# Patient Record
Sex: FEMALE | Race: WHITE | NOT HISPANIC OR LATINO | Employment: PART TIME | URBAN - NONMETROPOLITAN AREA
[De-identification: names, ages, dates, MRNs, and addresses within clinical notes are randomized per-mention and may not be internally consistent; named-entity substitution may affect disease eponyms.]

---

## 2022-05-29 ENCOUNTER — HOSPITAL ENCOUNTER (EMERGENCY)
Facility: HOSPITAL | Age: 27
Discharge: HOME | End: 2022-05-29
Attending: EMERGENCY MEDICINE

## 2022-05-29 VITALS
BODY MASS INDEX: 45.24 KG/M2 | OXYGEN SATURATION: 96 % | DIASTOLIC BLOOD PRESSURE: 100 MMHG | HEART RATE: 123 BPM | RESPIRATION RATE: 16 BRPM | SYSTOLIC BLOOD PRESSURE: 137 MMHG | TEMPERATURE: 97 F | HEIGHT: 62 IN | WEIGHT: 245.81 LBS

## 2022-05-29 DIAGNOSIS — J03.90 ACUTE TONSILLITIS, UNSPECIFIED ETIOLOGY: Primary | ICD-10-CM

## 2022-05-29 PROCEDURE — 99282 EMERGENCY DEPT VISIT SF MDM: CPT

## 2022-05-29 PROCEDURE — 99284 EMERGENCY DEPT VISIT MOD MDM: CPT | Performed by: PHYSICIAN ASSISTANT

## 2022-05-29 RX ORDER — AMOXICILLIN AND CLAVULANATE POTASSIUM 875; 125 MG/1; MG/1
1 TABLET, FILM COATED ORAL ONCE
Status: COMPLETED | OUTPATIENT
Start: 2022-05-29 | End: 2022-05-29

## 2022-05-29 RX ORDER — PREDNISONE 20 MG/1
40 TABLET ORAL ONCE
Status: COMPLETED | OUTPATIENT
Start: 2022-05-29 | End: 2022-05-29

## 2022-05-29 RX ORDER — AMOXICILLIN AND CLAVULANATE POTASSIUM 875; 125 MG/1; MG/1
1 TABLET, FILM COATED ORAL EVERY 12 HOURS SCHEDULED
Qty: 14 TABLET | Refills: 0 | Status: SHIPPED | OUTPATIENT
Start: 2022-05-29 | End: 2022-06-05

## 2022-05-29 RX ORDER — PREDNISONE 20 MG/1
20 TABLET ORAL DAILY
Qty: 5 TABLET | Refills: 0 | Status: SHIPPED | OUTPATIENT
Start: 2022-05-29 | End: 2022-06-03

## 2022-05-29 RX ADMIN — AMOXICILLIN AND CLAVULANATE POTASSIUM 1 TABLET: 875; 125 TABLET, FILM COATED ORAL at 22:43

## 2022-05-29 RX ADMIN — PREDNISONE 40 MG: 20 TABLET ORAL at 22:43

## 2022-05-30 NOTE — ED PROVIDER NOTES
History  Chief Complaint   Patient presents with    Sore Throat     Started Thursday  Pt reports known tonsil stones     The patient is a 47-year-old female who presents emergency department today with chief complaint of sore throat  The patient states that is been gradually worsening over last few days  She has noticed white spots on her tonsils and increasing pain  She states she has tried Tylenol Motrin over-the-counter  Denies any fevers chills, cough congestion, rhinorrhea, back pain nausea vomiting diarrhea  Patient has a recurrent history of strep throat and tonsil stones  She has not followed up was visualized due to lack of insurance  Sore Throat  Location:  Generalized  Quality:  Sore  Severity:  Moderate  Onset quality:  Gradual  Timing:  Constant  Progression:  Worsening  Chronicity:  New  Relieved by:  Nothing  Worsened by:  Nothing  Ineffective treatments:  None tried  Associated symptoms: no abdominal pain, no adenopathy, no chest pain, no chills, no cough, no ear discharge, no ear pain, no epistaxis, no eye discharge, no fever, no headaches, no neck stiffness, no night sweats, no plugged ear sensation, no postnasal drip, no rash, no rhinorrhea, no shortness of breath, no stridor, no trouble swallowing and no voice change        None       History reviewed  No pertinent past medical history  History reviewed  No pertinent surgical history  History reviewed  No pertinent family history  I have reviewed and agree with the history as documented  E-Cigarette/Vaping     E-Cigarette/Vaping Substances     Social History     Tobacco Use    Smoking status: Current Every Day Smoker     Packs/day: 1 00     Types: Cigarettes   Substance Use Topics    Alcohol use: Not Currently    Drug use: Never       Review of Systems   Constitutional: Negative for chills, fever and night sweats  HENT: Positive for sore throat   Negative for ear discharge, ear pain, nosebleeds, postnasal drip, rhinorrhea, trouble swallowing and voice change  Eyes: Negative for discharge  Respiratory: Negative for cough, shortness of breath and stridor  Cardiovascular: Negative for chest pain  Gastrointestinal: Negative for abdominal pain  Musculoskeletal: Negative for neck stiffness  Skin: Negative for rash  Neurological: Negative for headaches  Hematological: Negative for adenopathy  All other systems reviewed and are negative  Physical Exam  Physical Exam  Vitals and nursing note reviewed  Constitutional:       General: She is not in acute distress  Appearance: She is well-developed  HENT:      Head: Normocephalic and atraumatic  Right Ear: Tympanic membrane and external ear normal       Left Ear: Tympanic membrane and external ear normal       Nose: No congestion  Mouth/Throat:      Mouth: Mucous membranes are moist       Pharynx: Uvula midline  Posterior oropharyngeal erythema present  No pharyngeal swelling or uvula swelling  Tonsils: Tonsillar exudate present  No tonsillar abscesses  2+ on the right  2+ on the left  Eyes:      Conjunctiva/sclera: Conjunctivae normal       Pupils: Pupils are equal, round, and reactive to light  Cardiovascular:      Rate and Rhythm: Normal rate and regular rhythm  Heart sounds: No murmur heard  Pulmonary:      Effort: Pulmonary effort is normal  No respiratory distress  Breath sounds: Normal breath sounds  No wheezing  Abdominal:      General: Bowel sounds are normal       Palpations: Abdomen is soft  There is no mass  Tenderness: There is no abdominal tenderness  There is no rebound  Hernia: No hernia is present  Musculoskeletal:      Cervical back: Normal range of motion and neck supple  Skin:     General: Skin is warm and dry  Capillary Refill: Capillary refill takes less than 2 seconds  Neurological:      Mental Status: She is alert and oriented to person, place, and time        Coordination: Coordination normal    Psychiatric:         Behavior: Behavior normal          Vital Signs  ED Triage Vitals [05/29/22 2227]   Temperature Pulse Respirations Blood Pressure SpO2   (!) 97 °F (36 1 °C) (!) 123 16 137/100 96 %      Temp Source Heart Rate Source Patient Position - Orthostatic VS BP Location FiO2 (%)   Temporal Monitor Sitting Left arm --      Pain Score       5           Vitals:    05/29/22 2227 05/29/22 2230   BP: 137/100 137/100   Pulse: (!) 123    Patient Position - Orthostatic VS: Sitting          Visual Acuity      ED Medications  Medications   amoxicillin-clavulanate (AUGMENTIN) 875-125 mg per tablet 1 tablet (1 tablet Oral Given 5/29/22 2243)   predniSONE tablet 40 mg (40 mg Oral Given 5/29/22 2243)       Diagnostic Studies  Results Reviewed     None                 No orders to display              Procedures  Procedures         ED Course                               SBIRT 22yo+    Flowsheet Row Most Recent Value   SBIRT (23 yo +)    In order to provide better care to our patients, we are screening all of our patients for alcohol and drug use  Would it be okay to ask you these screening questions?  Unable to answer at this time Filed at: 05/29/2022 2247                    MDM  Number of Diagnoses or Management Options     Amount and/or Complexity of Data Reviewed  Decide to obtain previous medical records or to obtain history from someone other than the patient: yes  Review and summarize past medical records: yes  Independent visualization of images, tracings, or specimens: yes    Risk of Complications, Morbidity, and/or Mortality  Presenting problems: low  Diagnostic procedures: low  Management options: low    Patient Progress  Patient progress: stable      Disposition  Final diagnoses:   Acute tonsillitis, unspecified etiology     Time reflects when diagnosis was documented in both MDM as applicable and the Disposition within this note     Time User Action Codes Description Comment 5/29/2022 10:37 PM Soheila Vera Add [J03 90] Acute tonsillitis, unspecified etiology       ED Disposition     ED Disposition   Discharge    Condition   Stable    Date/Time   Sun May 29, 2022 10:37 PM    Comment   Lanny Rogers discharge to home/self care  Follow-up Information    None         Discharge Medication List as of 5/29/2022 10:42 PM      START taking these medications    Details   amoxicillin-clavulanate (AUGMENTIN) 875-125 mg per tablet Take 1 tablet by mouth every 12 (twelve) hours for 7 days, Starting Sun 5/29/2022, Until Sun 6/5/2022, Normal      predniSONE 20 mg tablet Take 1 tablet (20 mg total) by mouth daily for 5 days, Starting Sun 5/29/2022, Until Fri 6/3/2022, Normal             No discharge procedures on file      PDMP Review     None          ED Provider  Electronically Signed by           Wero Vilchis PA-C  05/29/22 9931

## 2022-07-17 ENCOUNTER — HOSPITAL ENCOUNTER (EMERGENCY)
Facility: HOSPITAL | Age: 27
Discharge: HOME | End: 2022-07-17
Attending: EMERGENCY MEDICINE | Admitting: EMERGENCY MEDICINE

## 2022-07-17 VITALS
HEART RATE: 78 BPM | WEIGHT: 245 LBS | DIASTOLIC BLOOD PRESSURE: 88 MMHG | OXYGEN SATURATION: 98 % | HEIGHT: 62 IN | BODY MASS INDEX: 45.08 KG/M2 | SYSTOLIC BLOOD PRESSURE: 135 MMHG | RESPIRATION RATE: 16 BRPM | TEMPERATURE: 97 F

## 2022-07-17 DIAGNOSIS — J06.9 VIRAL URI WITH COUGH: Primary | ICD-10-CM

## 2022-07-17 LAB
FLUAV RNA RESP QL NAA+PROBE: NEGATIVE
FLUBV RNA RESP QL NAA+PROBE: NEGATIVE
RSV RNA RESP QL NAA+PROBE: NEGATIVE
SARS-COV-2 RNA RESP QL NAA+PROBE: POSITIVE

## 2022-07-17 PROCEDURE — 99284 EMERGENCY DEPT VISIT MOD MDM: CPT | Performed by: EMERGENCY MEDICINE

## 2022-07-17 PROCEDURE — 99283 EMERGENCY DEPT VISIT LOW MDM: CPT

## 2022-07-17 PROCEDURE — 0241U HB NFCT DS VIR RESP RNA 4 TRGT: CPT | Performed by: EMERGENCY MEDICINE

## 2022-07-17 RX ORDER — ONDANSETRON 4 MG/1
4 TABLET, ORALLY DISINTEGRATING ORAL EVERY 6 HOURS PRN
Qty: 20 TABLET | Refills: 0 | Status: SHIPPED | OUTPATIENT
Start: 2022-07-17

## 2022-07-17 RX ORDER — ONDANSETRON 4 MG/1
4 TABLET, ORALLY DISINTEGRATING ORAL ONCE
Status: COMPLETED | OUTPATIENT
Start: 2022-07-17 | End: 2022-07-17

## 2022-07-17 RX ADMIN — ONDANSETRON 4 MG: 4 TABLET, ORALLY DISINTEGRATING ORAL at 18:35

## 2022-07-17 NOTE — Clinical Note
Francisca Bauman was seen and treated in our emergency department on 7/17/2022  Diagnosis:     Gordy Brown  may return to work on return date  She may return on this date: 07/22/2022    Please excuse Gordy Brown from 7/12, 7/13, and 7/15  If you have any questions or concerns, please don't hesitate to call        Henretta Riedel, MD    ______________________________           _______________          _______________  Hospital Representative                              Date                                Time

## 2022-07-17 NOTE — DISCHARGE INSTRUCTIONS
Return to the ED for worsening symptoms such as shortness of breath, chest pain, persistent vomiting, dehydration or any other concerns  You have tested for COVID  Please quarantine yourself for the next 5 days

## 2022-07-17 NOTE — Clinical Note
Carson Lehman was seen and treated in our emergency department on 7/17/2022  Diagnosis:     [facility]  may return to work on return date  She may return on this date: 07/20/2022    Please excuse [facility] from 7/12, 7/13, and 7/15  If you have any questions or concerns, please don't hesitate to call        Chey Meek MD    ______________________________           _______________          _______________  Hospital Representative                              Date                                Time

## 2022-07-17 NOTE — Clinical Note
Javier Mauricio was seen and treated in our emergency department on 7/17/2022  Diagnosis:     [place]  may return to work on return date  She may return on this date: 07/20/2022    Please excuse [place] from 7/12, 7/13, and 7/15  If you have any questions or concerns, please don't hesitate to call        Darren Shelby MD    ______________________________           _______________          _______________  Hospital Representative                              Date                                Time

## 2022-07-17 NOTE — Clinical Note
Sulema Brunner was seen and treated in our emergency department on 7/17/2022  Diagnosis:     Christiano Womack  may return to work on return date  She may return on this date: 07/20/2022    Please excuse Christiano Womack from 7/12, 7/13, and 7/15  If you have any questions or concerns, please don't hesitate to call        [place] Chirinos MD    ______________________________           _______________          _______________  Hospital Representative                              Date                                Time

## 2022-07-17 NOTE — Clinical Note
Pearley Castleman was seen and treated in our emergency department on 7/17/2022  Diagnosis:     Demetra Devlin  may return to work on return date  She may return on this date: 07/22/2022    Please excuse Demetra Devlin from 7/12, 7/13, and 7/15  If you have any questions or concerns, please don't hesitate to call        Clay Boss MD    ______________________________           _______________          _______________  Hospital Representative                              Date                                Time

## 2022-07-17 NOTE — ED PROVIDER NOTES
History  Chief Complaint   Patient presents with    Cough     Feeling sick since Tuesday with pain disc and diarrhea      HPI   27F w hx of endometriosis, PCOS, seizure disorder presenting w cough and weakness  For the past 5 days, patient has been having headaches, myalgias, diarrhea, sneezing, cough productive of green sputum, loss of appetite, and chills  Last wk, she was exposed a friend who had similar symptoms  Her friend tested for COVID and was negative  Took Tylenol w/ minimal relief  She is not vaccinated for COVID  PMHx: endometriosis, PCOS, seizure disorder    PSHx: laprascopy for endometriosis    Non-contributory family hx    I have reviewed and agree with the history as documented  E-Cigarette/Vaping    E-Cigarette Use Never User      E-Cigarette/Vaping Substances     Social History     Tobacco Use    Smoking status: Current Every Day Smoker     Packs/day: 1 00     Types: Cigarettes    Smokeless tobacco: Never Used   Vaping Use    Vaping Use: Never used   Substance Use Topics    Alcohol use: Not Currently    Drug use: Never   Currently smokes    Review of Systems   Constitutional: Negative for chills and fever  HENT: Negative for ear pain and sore throat  Eyes: Negative for pain and visual disturbance  Respiratory: Positive for cough  Negative for shortness of breath  Cardiovascular: Negative for chest pain and palpitations  Gastrointestinal: Positive for diarrhea and nausea  Negative for abdominal pain and vomiting  Genitourinary: Negative for dysuria and hematuria  Musculoskeletal: Positive for myalgias  Negative for arthralgias and back pain  Skin: Negative for color change and rash  Neurological: Positive for headaches  Negative for seizures and syncope  All other systems reviewed and are negative  Physical Exam  Physical Exam  Vitals and nursing note reviewed  Constitutional:       General: She is not in acute distress       Appearance: She is well-developed  HENT:      Head: Normocephalic and atraumatic  Eyes:      Conjunctiva/sclera: Conjunctivae normal    Cardiovascular:      Rate and Rhythm: Normal rate and regular rhythm  Heart sounds: No murmur heard  Pulmonary:      Effort: Pulmonary effort is normal  No respiratory distress  Breath sounds: Normal breath sounds  Abdominal:      Palpations: Abdomen is soft  Tenderness: There is no abdominal tenderness  Musculoskeletal:      Cervical back: Neck supple  Skin:     General: Skin is warm and dry  Neurological:      Mental Status: She is alert  Vital Signs  ED Triage Vitals [07/17/22 1807]   Temperature Pulse Respirations Blood Pressure SpO2   (!) 97 °F (36 1 °C) 78 16 135/88 98 %      Temp src Heart Rate Source Patient Position - Orthostatic VS BP Location FiO2 (%)   -- -- -- Left arm --      Pain Score       No Pain           Vitals:    07/17/22 1807   BP: 135/88   Pulse: 78         Visual Acuity      ED Medications  Medications   ondansetron (ZOFRAN-ODT) dispersible tablet 4 mg (4 mg Oral Given 7/17/22 1835)       Diagnostic Studies  Results Reviewed     Procedure Component Value Units Date/Time    FLU/RSV/COVID - if FLU/RSV clinically relevant [[id number]]  (Abnormal) Collected: 07/17/22 1825    Lab Status: Final result Specimen: Nares from Nose Updated: 07/17/22 1902     SARS-CoV-2 Positive     INFLUENZA A PCR Negative     INFLUENZA B PCR Negative     RSV PCR Negative    Narrative:      FOR PEDIATRIC PATIENTS - copy/paste COVID Guidelines URL to browser: [email or web address]    SARS-CoV-2 assay is a Nucleic Acid Amplification assay intended for the  qualitative detection of nucleic acid from SARS-CoV-2 in nasopharyngeal  swabs  Results are for the presumptive identification of SARS-CoV-2 RNA      Positive results are indicative of infection with SARS-CoV-2, the virus  causing COVID-19, but do not rule out bacterial infection or co-infection  with other viruses  Laboratories within the [place] and its  territories are required to report all positive results to the appropriate  public health authorities  Negative results do not preclude SARS-CoV-2  infection and should not be used as the sole basis for treatment or other  patient management decisions  Negative results must be combined with  clinical observations, patient history, and epidemiological information  This test has not been FDA cleared or approved  This test has been authorized by FDA under an Emergency Use Authorization  (EUA)  This test is only authorized for the duration of time the  declaration that circumstances exist justifying the authorization of the  emergency use of an in vitro diagnostic tests for detection of SARS-CoV-2  virus and/or diagnosis of COVID-19 infection under section 564(b)(1) of  the Act, 21 U  S C  384ZAL-2(B)(2), unless the authorization is terminated  or revoked sooner  The test has been validated but independent review by FDA  and CLIA is pending  Test performed using [employer] Gene[employer]pert: This RT-PCR assay targets N2,  a region unique to SARS-CoV-2  A conserved region in the E-gene was chosen  for pan-Sarbecovirus detection which includes SARS-CoV-2  No orders to display          Procedures  Procedures  None      ED Course       MDM  27F presenting w multiple complaints including headaches, myalgias, diarrhea, sneezing, cough  Vitals stable - hemodynamically stable, not tachycardic, SpO2 98%  Clinical presentation suggestive of viral URI, suspect COVID vs flu  COVID/Flu/RSV swab was obtained and will call patient back about results  Zofran prescribed for nausea  Discussed symptomatic management       Disposition  Final diagnoses:   Viral URI with cough     Time reflects when diagnosis was documented in both MDM as applicable and the Disposition within this note     Time User Action Codes Description Comment 7/17/2022  6:28 PM Gustavo Sargent Add [J06 9] Viral URI with cough       ED Disposition     ED Disposition   Discharge    Condition   Stable    Date/Time   Sun Jul 17, 2022  6:28 PM    Comment   [facility] Record discharge to home/self care  Follow-up Information    None         Discharge Medication List as of 7/17/2022  7:22 PM      START taking these medications    Details   ondansetron (Zofran ODT) 4 mg disintegrating tablet Take 1 tablet (4 mg total) by mouth every 6 (six) hours as needed for nausea or vomiting, Starting Sun 7/17/2022, Normal             No discharge procedures on file  PDMP Review     None          ED Provider  Electronically Signed by           Wilner Barron MD  07/17/22 6279    Addendum: 2043 Patient tested positive for COVID  I called patient and informed her of the results  Discussed quarantine precautions          Wilner Barron MD  07/17/22 6743

## 2022-07-17 NOTE — Clinical Note
Amy Cabot was seen and treated in our emergency department on 7/17/2022  Diagnosis:     Jerome Dubois  may return to work on return date  She may return on this date: 07/22/2022    Please excuse Jerome Dubois from 7/12, 7/13, and 7/15  If you have any questions or concerns, please don't hesitate to call        Claude Marine, MD    ______________________________           _______________          _______________  Hospital Representative                              Date                                Time

## 2022-09-11 ENCOUNTER — HOSPITAL ENCOUNTER (EMERGENCY)
Facility: HOSPITAL | Age: 27
Discharge: HOME | End: 2022-09-11
Attending: EMERGENCY MEDICINE | Admitting: EMERGENCY MEDICINE

## 2022-09-11 VITALS
DIASTOLIC BLOOD PRESSURE: 74 MMHG | HEART RATE: 85 BPM | SYSTOLIC BLOOD PRESSURE: 152 MMHG | TEMPERATURE: 97.2 F | OXYGEN SATURATION: 96 % | WEIGHT: 246.47 LBS | RESPIRATION RATE: 18 BRPM | BODY MASS INDEX: 45.08 KG/M2

## 2022-09-11 DIAGNOSIS — M54.50 LOW BACK PAIN: Primary | ICD-10-CM

## 2022-09-11 LAB
FLUAV RNA RESP QL NAA+PROBE: NEGATIVE
FLUBV RNA RESP QL NAA+PROBE: NEGATIVE
RSV RNA RESP QL NAA+PROBE: NEGATIVE
SARS-COV-2 RNA RESP QL NAA+PROBE: NEGATIVE

## 2022-09-11 PROCEDURE — 0241U HB NFCT DS VIR RESP RNA 4 TRGT: CPT

## 2022-09-11 PROCEDURE — 99283 EMERGENCY DEPT VISIT LOW MDM: CPT

## 2022-09-11 PROCEDURE — 99283 EMERGENCY DEPT VISIT LOW MDM: CPT | Performed by: EMERGENCY MEDICINE

## 2022-09-11 PROCEDURE — 96372 THER/PROPH/DIAG INJ SC/IM: CPT

## 2022-09-11 PROCEDURE — 20552 NJX 1/MLT TRIGGER POINT 1/2: CPT | Performed by: EMERGENCY MEDICINE

## 2022-09-11 RX ORDER — KETOROLAC TROMETHAMINE 30 MG/ML
30 INJECTION, SOLUTION INTRAMUSCULAR; INTRAVENOUS ONCE
Status: COMPLETED | OUTPATIENT
Start: 2022-09-11 | End: 2022-09-11

## 2022-09-11 RX ORDER — ACETAMINOPHEN 325 MG/1
650 TABLET ORAL ONCE
Status: COMPLETED | OUTPATIENT
Start: 2022-09-11 | End: 2022-09-11

## 2022-09-11 RX ORDER — BUPIVACAINE HYDROCHLORIDE 2.5 MG/ML
10 INJECTION, SOLUTION EPIDURAL; INFILTRATION; INTRACAUDAL ONCE
Status: COMPLETED | OUTPATIENT
Start: 2022-09-11 | End: 2022-09-11

## 2022-09-11 RX ORDER — METHYLPREDNISOLONE 4 MG/1
TABLET ORAL
Qty: 21 TABLET | Refills: 0 | Status: SHIPPED | OUTPATIENT
Start: 2022-09-11

## 2022-09-11 RX ORDER — TRIAMCINOLONE ACETONIDE 40 MG/ML
40 INJECTION, SUSPENSION INTRA-ARTICULAR; INTRAMUSCULAR ONCE
Status: COMPLETED | OUTPATIENT
Start: 2022-09-11 | End: 2022-09-11

## 2022-09-11 RX ORDER — CYCLOBENZAPRINE HCL 10 MG
10 TABLET ORAL
Qty: 5 TABLET | Refills: 0 | Status: SHIPPED | OUTPATIENT
Start: 2022-09-11 | End: 2022-09-16

## 2022-09-11 RX ADMIN — TRIAMCINOLONE ACETONIDE 40 MG: 40 INJECTION, SUSPENSION INTRA-ARTICULAR; INTRAMUSCULAR at 11:56

## 2022-09-11 RX ADMIN — TRIAMCINOLONE ACETONIDE 40 MG: 40 INJECTION, SUSPENSION INTRA-ARTICULAR; INTRAMUSCULAR at 12:48

## 2022-09-11 RX ADMIN — BUPIVACAINE HYDROCHLORIDE 10 ML: 2.5 INJECTION, SOLUTION EPIDURAL; INFILTRATION; INTRACAUDAL; PERINEURAL at 11:56

## 2022-09-11 RX ADMIN — ACETAMINOPHEN 650 MG: 325 TABLET ORAL at 11:55

## 2022-09-11 RX ADMIN — KETOROLAC TROMETHAMINE 30 MG: 30 INJECTION, SOLUTION INTRAMUSCULAR at 11:56

## 2022-09-11 NOTE — Clinical Note
Adrien Chun was seen and treated in our emergency department on 9/11/2022  Diagnosis:     Kacy Elise  is off the rest of the shift today  She may return on this date:     Please excuse patient from work from 9/10/22 (yesterday) as well  If you have any questions or concerns, please don't hesitate to call        Cadence Cates MD    ______________________________           _______________          _______________  Hospital Representative                              Date                                Time

## 2022-09-11 NOTE — DISCHARGE INSTRUCTIONS
You were seen today for back pain  We gave you medications for your back pain  If your back pain persists, then please see your PCP  If you develop inability to feel your legs, walk, pooping or peeing yourself, then you NEED to see a healthcare provider or go to an ED  How is back pain treated? Most people with an episode of low back pain do not have a serious medical problem, and can try simple treatments such as:  -Staying active - The best thing you can do is to stay as active as possible  People with low back pain recover faster if they stay active  If your pain is severe, you might need to rest for a day or 2  But it's important to get back to walking and moving as soon as possible  While you should avoid heavy lifting and sports while your back hurts, try to keep doing your normal daily activities   -Heat - Some people find that it helps to use a heating pad or heated wrap  Be careful to avoid high heat settings to prevent skin burns  -Medicines - First, you can try pain medicines that you can get without a prescription  In many cases, doctors suggest first trying a nonsteroidal antiinflammatory drug, or "NSAID " NSAIDs include ibuprofen (sample brand names: Advil, Motrin) and naproxen (sample brand name: Aleve)  These might work better than acetaminophen (Tylenol) for back pain  While back pain usually goes away within a few weeks, some people do continue to have pain for longer  In this case, additional treatments might include:  -Self care - This involves being aware of your pain  While you should rest when you need to, it's important to stay active as much as you can  Things like applying heat and doing gentle stretches can help you feel better, too   -Physical therapy - A physical therapist is an exercise expert who can teach you stretches and movements to help strengthen your muscles  The goal is to relieve pain but also help you get back to your normal activities      Exercises you can try include walking, swimming, or using an exercise bike  Some people also find that [address] or yoga can help with their back pain  Finding activities you enjoy can help you stay active  -Reducing stress - Some people find that it helps to try something called "mindfulness-based stress reduction " This involves going to a group program to practice relaxation and meditation  If your back pain is making you feel anxious or depressed, talk to your doctor or nurse  There are other treatments that can help with these problems  Only a small number of people end up needing surgery to treat back pain  What can I do to keep from getting back pain again? The best thing you can do is to stay active  Doing exercises to strengthen and stretch your back can help  You can also:  -Learn to lift using your legs instead of your back  -Avoid sitting or standing in the same position for too long    Having back pain can be frustrating and scary  But it can help to know that doing these things can lower your risk of having another episode

## 2022-09-11 NOTE — ED PROVIDER NOTES
History  Chief Complaint   Patient presents with    Back Pain     Pt states lower left back pain for months that is worse now that she took a new job  Describes pain as burning  Denies changes to bowel or bladder     HPI   27F presenting with lower back pain  For the past few months, has been having left lower back pain  Pain described as burning sensation  Has been worsening over the past 2 wks and yesterday was at its worst  Yesterday, patient lifted a box up  Patient is a   Has tried Tylenol and Advil with minimal relief  No previous back injuries or surgeries  Denies IVDU  Denies fevers, bowel/bladder incontinence, saddle anesthesia, leg numbness/weakness  Prior to Admission Medications   Prescriptions Last Dose Informant Patient Reported? Taking?   ondansetron (Zofran ODT) 4 mg disintegrating tablet   No No   Sig: Take 1 tablet (4 mg total) by mouth every 6 (six) hours as needed for nausea or vomiting      Facility-Administered Medications: None       History reviewed  No pertinent past medical history  Past Surgical History:   Procedure Laterality Date    ENDOMETRIAL BIOPSY         History reviewed  No pertinent family history  I have reviewed and agree with the history as documented  E-Cigarette/Vaping    E-Cigarette Use Current Every Day User      E-Cigarette/Vaping Substances    Flavoring Yes      Social History     Tobacco Use    Smoking status: Current Every Day Smoker     Packs/day: 1 00     Types: Cigarettes    Smokeless tobacco: Never Used   Vaping Use    Vaping Use: Every day    Substances: Flavoring   Substance Use Topics    Alcohol use: Not Currently    Drug use: Never       Review of Systems   Constitutional: Negative for chills and fever  HENT: Negative for ear pain and sore throat  Eyes: Negative for pain and visual disturbance  Respiratory: Negative for cough and shortness of breath  Cardiovascular: Negative for chest pain and palpitations  Gastrointestinal: Negative for abdominal pain and vomiting  Genitourinary: Negative for dysuria and hematuria  Musculoskeletal: Positive for back pain  Negative for arthralgias  Skin: Negative for color change and rash  Neurological: Negative for seizures and syncope  All other systems reviewed and are negative  Physical Exam  Physical Exam  Vitals and nursing note reviewed  Constitutional:       General: She is not in acute distress  Appearance: She is well-developed  HENT:      Head: Normocephalic and atraumatic  Cardiovascular:      Rate and Rhythm: Normal rate and regular rhythm  Musculoskeletal:         General: Tenderness present  Cervical back: Neck supple  Comments: Point tenderness over left lumbar region   Skin:     General: Skin is warm and dry  Neurological:      Mental Status: She is alert  Cranial Nerves: Cranial nerves are intact  Sensory: Sensation is intact  Motor: Motor function is intact  Coordination: Coordination is intact           Vital Signs  ED Triage Vitals   Temperature Pulse Respirations Blood Pressure SpO2   09/11/22 1134 09/11/22 1134 09/11/22 1134 09/11/22 1134 09/11/22 1134   (!) 97 2 °F (36 2 °C) (!) 115 20 152/74 99 %      Temp Source Heart Rate Source Patient Position - Orthostatic VS BP Location FiO2 (%)   09/11/22 1134 09/11/22 1245 09/11/22 1134 09/11/22 1134 --   Temporal Monitor Lying Left arm       Pain Score       09/11/22 1134       9           Vitals:    09/11/22 1134 09/11/22 1245   BP: 152/74    Pulse: (!) 115 85   Patient Position - Orthostatic VS: Lying          Visual Acuity      ED Medications  Medications   ketorolac (TORADOL) injection 30 mg (30 mg Intramuscular Given 9/11/22 1156)   acetaminophen (TYLENOL) tablet 650 mg (650 mg Oral Given 9/11/22 1155)   bupivacaine (PF) (MARCAINE) 0 25 % injection 10 mL (10 mL Infiltration Given 9/11/22 1156)   triamcinolone acetonide (KENALOG-40) 40 mg/mL injection 40 mg (40 mg Intramuscular Given 9/11/22 1156)   triamcinolone acetonide (KENALOG-40) 40 mg/mL injection 40 mg (40 mg Intramuscular Given 9/11/22 1248)       Diagnostic Studies  Results Reviewed     Procedure Component Value Units Date/Time    FLU/RSV/COVID - if FLU/RSV clinically relevant [[id number]]  (Normal) Collected: 09/11/22 1219    Lab Status: Final result Specimen: Nares from Nose Updated: 09/11/22 1310     SARS-CoV-2 Negative     INFLUENZA A PCR Negative     INFLUENZA B PCR Negative     RSV PCR Negative    Narrative:      FOR PEDIATRIC PATIENTS - copy/paste COVID Guidelines URL to browser: [email or web address]  [email or web address]    SARS-CoV-2 assay is a Nucleic Acid Amplification assay intended for the  qualitative detection of nucleic acid from SARS-CoV-2 in nasopharyngeal  swabs  Results are for the presumptive identification of SARS-CoV-2 RNA  Positive results are indicative of infection with SARS-CoV-2, the virus  causing COVID-19, but do not rule out bacterial infection or co-infection  with other viruses  Laboratories within the [place] and its  territories are required to report all positive results to the appropriate  public health authorities  Negative results do not preclude SARS-CoV-2  infection and should not be used as the sole basis for treatment or other  patient management decisions  Negative results must be combined with  clinical observations, patient history, and epidemiological information  This test has not been FDA cleared or approved  This test has been authorized by FDA under an Emergency Use Authorization  (EUA)  This test is only authorized for the duration of time the  declaration that circumstances exist justifying the authorization of the  emergency use of an in vitro diagnostic tests for detection of SARS-CoV-2  virus and/or diagnosis of COVID-19 infection under section 564(b)(1) of  the Act, 21 U  S C  035HIA-9(A)(0), unless the authorization is terminated  or revoked sooner  The test has been validated but independent review by FDA  and CLIA is pending  Test performed using [employer] Gene[employer]pert: This RT-PCR assay targets N2,  a region unique to SARS-CoV-2  A conserved region in the E-gene was chosen  for pan-Sarbecovirus detection which includes SARS-CoV-2  No orders to display              Procedures  Trigger Injection 1 or 2 muscles    Date/Time: 9/11/2022 12:53 PM  Performed by: Daniele Davila MD  Authorized by: Daniele Davila MD     Patient location:  ED  Consent:     Consent obtained:  Verbal    Consent given by:  Patient    Risks discussed: Allergic reaction, bleeding, intravenous injection, nerve damage, pain, infection, swelling and unsuccessful block    Alternatives discussed:  Alternative treatment  Universal protocol:     Patient identity confirmed:  Verbally with patient  Location:     Body area:  Lower back    Injection Trigger Points:  1  Pre-procedure details:     Skin preparation:  Chloraprep  Skin anesthesia:     Skin anesthesia method:  Local infiltration    Local anesthetic:  Bupivacaine 0 25% w/o epi  Procedure details:     Needle gauge:  25 G    Anesthetic injected:  Bupivacaine 0 25% w/o epi    Steroid injected:  Triamcinolone    Additive injected:  None    Injection procedure:  Incremental injection, negative aspiration for blood, introduced needle, anatomic landmarks palpated and anatomic landmarks identified  Post-procedure details:     Dressing:  Sterile dressing    Outcome:  Pain improved    Patient tolerance of procedure: Tolerated well, no immediate complications        ED Course  ED Course as of 09/11/22 1331   Sun Sep 11, 2022   1318 Reevaluated  Some improvement in back pain  MDM  27F presenting w lower back pain  Likely muscle strain  No red flag symptoms for back pain  No neurologic deficits  Given pain medications and trigger point injection with improvement of pain   Prescribed flexeril and medrol dosepak for outpatient use  COVID/Flu/RSV negative, and patient informed  Discharged home in stable condition  Disposition  Final diagnoses:   Low back pain     Time reflects when diagnosis was documented in both MDM as applicable and the Disposition within this note     Time User Action Codes Description Comment    9/11/2022 12:59 PM Bandar Chowdhury John [M54 50] Low back pain       ED Disposition     ED Disposition   Discharge    Condition   Stable    Date/Time   Sun Sep 11, 2022 12:59 PM    Comment   Kiana Garg discharge to home/self care  Follow-up Information    None         Patient's Medications   Discharge Prescriptions    CYCLOBENZAPRINE (FLEXERIL) 10 MG TABLET    Take 1 tablet (10 mg total) by mouth daily at bedtime for 5 days       Start Date: 9/11/2022 End Date: 9/16/2022       Order Dose: 10 mg       Quantity: 5 tablet    Refills: 0    METHYLPREDNISOLONE 4 MG TABLET THERAPY PACK    Use as directed on package       Start Date: 9/11/2022 End Date: --       Order Dose: --       Quantity: 21 tablet    Refills: 0       No discharge procedures on file      PDMP Review     None          ED Provider  Electronically Signed by           Jose Rapp MD  09/11/22 3651

## 2023-11-04 NOTE — Clinical Note
Maury Duong was seen and treated in our emergency department on 7/17/2022  Diagnosis:     [place]  may return to work on return date  She may return on this date: 07/22/2022    Please excuse [place] from 7/12, 7/13, and 7/15  If you have any questions or concerns, please don't hesitate to call        Tami Nunes MD    ______________________________           _______________          _______________  Hospital Representative                              Date                                Time Female

## 2024-07-26 ENCOUNTER — DOCTOR'S OFFICE (OUTPATIENT)
Dept: URBAN - NONMETROPOLITAN AREA CLINIC 1 | Facility: CLINIC | Age: 29
Setting detail: OPHTHALMOLOGY
End: 2024-07-26
Payer: COMMERCIAL

## 2024-07-26 DIAGNOSIS — S05.01XA: ICD-10-CM

## 2024-07-26 PROBLEM — H52.03 HYPEROPIA; BOTH EYES: Status: ACTIVE | Noted: 2024-07-26

## 2024-07-26 PROCEDURE — 99213 OFFICE O/P EST LOW 20 MIN: CPT | Performed by: OPHTHALMOLOGY

## 2024-07-26 ASSESSMENT — CONFRONTATIONAL VISUAL FIELD TEST (CVF)
OD_FINDINGS: FULL
OS_FINDINGS: FULL

## 2025-01-22 ENCOUNTER — APPOINTMENT (EMERGENCY)
Dept: NON INVASIVE DIAGNOSTICS | Facility: HOSPITAL | Age: 30
End: 2025-01-22

## 2025-01-22 ENCOUNTER — HOSPITAL ENCOUNTER (EMERGENCY)
Facility: HOSPITAL | Age: 30
Discharge: HOME | End: 2025-01-22
Attending: EMERGENCY MEDICINE

## 2025-01-22 ENCOUNTER — APPOINTMENT (EMERGENCY)
Dept: RADIOLOGY | Facility: HOSPITAL | Age: 30
End: 2025-01-22

## 2025-01-22 VITALS
DIASTOLIC BLOOD PRESSURE: 93 MMHG | RESPIRATION RATE: 16 BRPM | SYSTOLIC BLOOD PRESSURE: 142 MMHG | TEMPERATURE: 99.1 F | BODY MASS INDEX: 43.82 KG/M2 | HEART RATE: 86 BPM | WEIGHT: 239.6 LBS | OXYGEN SATURATION: 99 %

## 2025-01-22 DIAGNOSIS — M25.551 RIGHT HIP PAIN: Primary | ICD-10-CM

## 2025-01-22 DIAGNOSIS — S76.211A INGUINAL STRAIN, RIGHT, INITIAL ENCOUNTER: ICD-10-CM

## 2025-01-22 PROCEDURE — 99283 EMERGENCY DEPT VISIT LOW MDM: CPT

## 2025-01-22 PROCEDURE — 96372 THER/PROPH/DIAG INJ SC/IM: CPT

## 2025-01-22 PROCEDURE — 99284 EMERGENCY DEPT VISIT MOD MDM: CPT | Performed by: PHYSICIAN ASSISTANT

## 2025-01-22 PROCEDURE — 93971 EXTREMITY STUDY: CPT

## 2025-01-22 PROCEDURE — 93971 EXTREMITY STUDY: CPT | Performed by: SURGERY

## 2025-01-22 PROCEDURE — 73502 X-RAY EXAM HIP UNI 2-3 VIEWS: CPT

## 2025-01-22 RX ORDER — OXYCODONE AND ACETAMINOPHEN 5; 325 MG/1; MG/1
1 TABLET ORAL EVERY 6 HOURS PRN
Qty: 6 TABLET | Refills: 0 | Status: SHIPPED | OUTPATIENT
Start: 2025-01-22

## 2025-01-22 RX ORDER — PREDNISONE 20 MG/1
40 TABLET ORAL DAILY
Qty: 10 TABLET | Refills: 0 | Status: SHIPPED | OUTPATIENT
Start: 2025-01-22 | End: 2025-01-27

## 2025-01-22 RX ORDER — OXYCODONE AND ACETAMINOPHEN 5; 325 MG/1; MG/1
1 TABLET ORAL ONCE
Refills: 0 | Status: COMPLETED | OUTPATIENT
Start: 2025-01-22 | End: 2025-01-22

## 2025-01-22 RX ORDER — KETOROLAC TROMETHAMINE 30 MG/ML
30 INJECTION, SOLUTION INTRAMUSCULAR; INTRAVENOUS ONCE
Status: COMPLETED | OUTPATIENT
Start: 2025-01-22 | End: 2025-01-22

## 2025-01-22 RX ORDER — METHOCARBAMOL 500 MG/1
500 TABLET, FILM COATED ORAL 2 TIMES DAILY
Qty: 20 TABLET | Refills: 0 | Status: SHIPPED | OUTPATIENT
Start: 2025-01-22

## 2025-01-22 RX ADMIN — KETOROLAC TROMETHAMINE 30 MG: 30 INJECTION, SOLUTION INTRAMUSCULAR; INTRAVENOUS at 12:50

## 2025-01-22 RX ADMIN — OXYCODONE HYDROCHLORIDE AND ACETAMINOPHEN 1 TABLET: 5; 325 TABLET ORAL at 12:49

## 2025-01-22 NOTE — Clinical Note
Michaela Weiner was seen and treated in our emergency department on 1/22/2025.                Diagnosis:     Michaela  is off the rest of the shift today, may return to work on return date.    She may return on this date: 01/29/2025         If you have any questions or concerns, please don't hesitate to call.      Walter Dawson PA-C    ______________________________           _______________          _______________  Hospital Representative                              Date                                Time

## 2025-01-23 NOTE — ED PROVIDER NOTES
Time reflects when diagnosis was documented in both MDM as applicable and the Disposition within this note       Time User Action Codes Description Comment    1/22/2025  1:49 PM Walter Dawson [M25.551] Right hip pain     1/22/2025  1:49 PM Walter Dawson [S76.211A] Inguinal strain, right, initial encounter           ED Disposition       ED Disposition   Discharge    Condition   Stable    Date/Time   Wed Jan 22, 2025  1:49 PM    Comment   Michaela Weiner discharge to home/self care.                   Assessment & Plan       Medical Decision Making  Patient is a 29-year-old female presents today with a chief complaint of right hip pain.  The patient states that over the last few days she has been having right hip and leg pain.  She states that it gradually worsened but today noticed to have pain shooting down the anterior leg.  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  She denies any known falls or injuries that may have provoked this.  Taken over-the-counter pain medication without relief.  Denies any back pain nausea vomiting diarrhea dysuria hematuria.    Patient has pain with active range of motion of the right hip.  Also has pain with passive range of motion of the right hip with internal and external rotation.  Patient had negative DVT study.  Imaging for any acute bony abnormality.  Will continue to treat for groin strain/hip pain.  Concern for groin injury at this time due to repetitive movements.  Patient in agreement with treatment plan will continue treatment and follow-up with orthopedics as needed.    Amount and/or Complexity of Data Reviewed  Radiology: ordered and independent interpretation performed. Decision-making details documented in ED Course.    Risk  Prescription drug management.        ED Course as of 01/22/25 2027 Wed Jan 22, 2025   1341 No DVT         Medications    ketorolac (TORADOL) injection 30 mg (30 mg Intramuscular Given 1/22/25 1250)   oxyCODONE-acetaminophen (PERCOCET) 5-325 mg per tablet 1 tablet (1 tablet Oral Given 1/22/25 1249)       ED Risk Strat Scores                          SBIRT 22yo+      Flowsheet Row Most Recent Value   Initial Alcohol Screen: US AUDIT-C     1. How often do you have a drink containing alcohol? 0 Filed at: 01/22/2025 1201   2. How many drinks containing alcohol do you have on a typical day you are drinking?  0 Filed at: 01/22/2025 1201   3b. FEMALE Any Age, or MALE 65+: How often do you have 4 or more drinks on one occassion? 0 Filed at: 01/22/2025 1201   Audit-C Score 0 Filed at: 01/22/2025 1201   REINA: How many times in the past year have you...    Used an illegal drug or used a prescription medication for non-medical reasons? Never Filed at: 01/22/2025 1201                            History of Present Illness       Chief Complaint   Patient presents with    Hip Pain     Reports pain in her right hip for the past week, reports pain starts in her hip and radiates down to her ankle describes it as a burning pain. Took tylenol around 0730 without relief.       Past Medical History:   Diagnosis Date    Endometriosis       Past Surgical History:   Procedure Laterality Date    ENDOMETRIAL BIOPSY        History reviewed. No pertinent family history.   Social History     Tobacco Use    Smoking status: Every Day     Current packs/day: 1.00     Types: Cigarettes    Smokeless tobacco: Never   Vaping Use    Vaping status: Every Day    Substances: Flavoring   Substance Use Topics    Alcohol use: Not Currently    Drug use: Never      E-Cigarette/Vaping    E-Cigarette Use Current Every Day User       E-Cigarette/Vaping Substances    Flavoring Yes       I have reviewed and agree with the history as documented.     Patient is a 29-year-old female presents today with a chief complaint of right hip pain.  The patient states that over the last few days  she has been having right hip and leg pain.  She states that it gradually worsened but today noticed to have pain shooting down the anterior leg.  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  She denies any known falls or injuries that may have provoked this.  Taken over-the-counter pain medication without relief.  Denies any back pain nausea vomiting diarrhea dysuria hematuria.          Review of Systems   All other systems reviewed and are negative.          Objective       ED Triage Vitals [01/22/25 1158]   Temperature Pulse Blood Pressure Respirations SpO2 Patient Position - Orthostatic VS   99.1 °F (37.3 °C) 86 142/93 16 99 % Sitting      Temp Source Heart Rate Source BP Location FiO2 (%) Pain Score    Temporal Monitor Left arm -- 5      Vitals      Date and Time Temp Pulse SpO2 Resp BP Pain Score FACES Pain Rating User   01/22/25 1250 -- -- -- -- -- 6 -- PK   01/22/25 1249 -- -- -- -- -- 6 -- PK   01/22/25 1158 99.1 °F (37.3 °C) 86 99 % 16 142/93 5 -- BF            Physical Exam  Vitals and nursing note reviewed.   Constitutional:       General: She is in acute distress.      Appearance: She is well-developed.   HENT:      Head: Normocephalic and atraumatic.      Right Ear: External ear normal.      Left Ear: External ear normal.   Eyes:      Pupils: Pupils are equal, round, and reactive to light.   Cardiovascular:      Rate and Rhythm: Normal rate and regular rhythm.      Heart sounds: No murmur heard.  Pulmonary:      Effort: Pulmonary effort is normal. No respiratory distress.      Breath sounds: Normal breath sounds. No wheezing.   Abdominal:      General: Bowel sounds are normal.      Palpations: Abdomen is soft. There is no mass.      Tenderness: There is no abdominal tenderness. There is no rebound.      Hernia: No hernia is present.   Musculoskeletal:      Cervical back: Normal range of motion  and neck supple.      Lumbar back: Normal. Negative right straight leg raise test and negative left straight leg raise test.      Comments: Patient has no reproducible tenderness to palpation over the right hip or leg.  The patient does have pain with active range of motion of the right hip.  Patient has pain with internal and external rotation of the right hip.   Skin:     General: Skin is warm and dry.      Capillary Refill: Capillary refill takes less than 2 seconds.   Neurological:      General: No focal deficit present.      Mental Status: She is alert and oriented to person, place, and time.      Coordination: Coordination normal.      Gait: Gait is intact.   Psychiatric:         Behavior: Behavior normal.         Results Reviewed       None            VAS VENOUS DUPLEX -LOWER LIMB UNILATERAL   Final Interpretation by Felicia Lane DO (01/22 8805)      XR hip/pelv 2-3 vws right if performed   Final Interpretation by Boni Minor MD (01/22 1313)      No acute osseous abnormality.   Please note that radiography does not evaluate vasculature. If there is concern for deep vein thrombosis, sonography would be recommended.         Computerized Assisted Algorithm (CAA) may have been used to analyze all applicable images.            Workstation performed: [id number]             Procedures    ED Medication and Procedure Management   Prior to Admission Medications   Prescriptions Last Dose Informant Patient Reported? Taking?   cyclobenzaprine (FLEXERIL) 10 mg tablet   No No   Sig: Take 1 tablet (10 mg total) by mouth daily at bedtime for 5 days   methylPREDNISolone 4 MG tablet therapy pack   No No   Sig: Use as directed on package   ondansetron (Zofran ODT) 4 mg disintegrating tablet   No No   Sig: Take 1 tablet (4 mg total) by mouth every 6 (six) hours as needed for nausea or vomiting      Facility-Administered Medications: None     Discharge Medication List as of 1/22/2025  1:50 PM        START taking  these medications    Details   methocarbamol (ROBAXIN) 500 mg tablet Take 1 tablet (500 mg total) by mouth 2 (two) times a day, Starting Wed 1/22/2025, Normal      oxyCODONE-acetaminophen (Percocet) 5-325 mg per tablet Take 1 tablet by mouth every 6 (six) hours as needed for severe pain Initial pain therapy Max Daily Amount: 4 tablets, Starting Wed 1/22/2025, Normal      predniSONE 20 mg tablet Take 2 tablets (40 mg total) by mouth daily for 5 days, Starting Wed 1/22/2025, Until Mon 1/27/2025, Normal           CONTINUE these medications which have NOT CHANGED    Details   cyclobenzaprine (FLEXERIL) 10 mg tablet Take 1 tablet (10 mg total) by mouth daily at bedtime for 5 days, Starting Sun 9/11/2022, Until Fri 9/16/2022, Normal      methylPREDNISolone 4 MG tablet therapy pack Use as directed on package, Normal      ondansetron (Zofran ODT) 4 mg disintegrating tablet Take 1 tablet (4 mg total) by mouth every 6 (six) hours as needed for nausea or vomiting, Starting Sun 7/17/2022, Normal             ED SEPSIS DOCUMENTATION   Time reflects when diagnosis was documented in both MDM as applicable and the Disposition within this note       Time User Action Codes Description Comment    1/22/2025  1:49 PM Walter Dawson [M25.551] Right hip pain     1/22/2025  1:49 PM Walter Dawson [S76.211A] Inguinal strain, right, initial encounter                  Walter Dawson PA-C  01/22/25 2027